# Patient Record
Sex: FEMALE | Race: WHITE
[De-identification: names, ages, dates, MRNs, and addresses within clinical notes are randomized per-mention and may not be internally consistent; named-entity substitution may affect disease eponyms.]

---

## 2017-01-25 ENCOUNTER — RECORDS - HEALTHEAST (OUTPATIENT)
Dept: ADMINISTRATIVE | Facility: OTHER | Age: 47
End: 2017-01-25

## 2017-12-14 DIAGNOSIS — N95.1 PERIMENOPAUSE: ICD-10-CM

## 2017-12-14 DIAGNOSIS — Z30.41 ENCOUNTER FOR SURVEILLANCE OF CONTRACEPTIVE PILLS: ICD-10-CM

## 2017-12-14 DIAGNOSIS — Z01.419 ENCOUNTER FOR ROUTINE GYNECOLOGICAL EXAMINATION: ICD-10-CM

## 2017-12-14 RX ORDER — LEVONORGESTREL/ETHIN.ESTRADIOL 0.1-0.02MG
1 TABLET ORAL DAILY
Qty: 90 TABLET | Refills: 3 | Status: SHIPPED | OUTPATIENT
Start: 2017-12-14

## 2017-12-14 NOTE — TELEPHONE ENCOUNTER
Received refill request for OCP. Patient up to date on PAP but hasn't been seen since 2015. Able to refill for one more year. Left message for patient to call and schedule annual appt.

## 2018-02-26 ENCOUNTER — RECORDS - HEALTHEAST (OUTPATIENT)
Dept: ADMINISTRATIVE | Facility: OTHER | Age: 48
End: 2018-02-26

## 2019-02-15 ENCOUNTER — HEALTH MAINTENANCE LETTER (OUTPATIENT)
Age: 49
End: 2019-02-15

## 2019-09-12 ENCOUNTER — RECORDS - HEALTHEAST (OUTPATIENT)
Dept: ADMINISTRATIVE | Facility: OTHER | Age: 49
End: 2019-09-12

## 2019-09-17 ENCOUNTER — RECORDS - HEALTHEAST (OUTPATIENT)
Dept: ADMINISTRATIVE | Facility: OTHER | Age: 49
End: 2019-09-17

## 2019-09-30 ENCOUNTER — HEALTH MAINTENANCE LETTER (OUTPATIENT)
Age: 49
End: 2019-09-30

## 2019-11-26 ENCOUNTER — OFFICE VISIT - HEALTHEAST (OUTPATIENT)
Dept: FAMILY MEDICINE | Facility: CLINIC | Age: 49
End: 2019-11-26

## 2019-11-26 DIAGNOSIS — Z20.828 EXPOSURE TO MONONUCLEOSIS SYNDROME: ICD-10-CM

## 2019-11-26 DIAGNOSIS — R53.83 FATIGUE, UNSPECIFIED TYPE: ICD-10-CM

## 2019-11-26 DIAGNOSIS — H66.002 NON-RECURRENT ACUTE SUPPURATIVE OTITIS MEDIA OF LEFT EAR WITHOUT SPONTANEOUS RUPTURE OF TYMPANIC MEMBRANE: ICD-10-CM

## 2019-11-26 LAB
ERYTHROCYTE [DISTWIDTH] IN BLOOD BY AUTOMATED COUNT: 9.7 % (ref 11–14.5)
HCG UR QL: NEGATIVE
HCT VFR BLD AUTO: 41.6 % (ref 35–47)
HGB BLD-MCNC: 14 G/DL (ref 12–16)
MCH RBC QN AUTO: 32.6 PG (ref 27–34)
MCHC RBC AUTO-ENTMCNC: 33.7 G/DL (ref 32–36)
MCV RBC AUTO: 97 FL (ref 80–100)
MONOCYTES NFR BLD AUTO: NEGATIVE %
PLATELET # BLD AUTO: 225 THOU/UL (ref 140–440)
PMV BLD AUTO: 8.8 FL (ref 7–10)
RBC # BLD AUTO: 4.31 MILL/UL (ref 3.8–5.4)
TSH SERPL DL<=0.005 MIU/L-ACNC: 4 UIU/ML (ref 0.3–5)
WBC: 4.6 THOU/UL (ref 4–11)

## 2019-12-05 ENCOUNTER — OFFICE VISIT - HEALTHEAST (OUTPATIENT)
Dept: FAMILY MEDICINE | Facility: CLINIC | Age: 49
End: 2019-12-05

## 2019-12-05 DIAGNOSIS — R53.83 FATIGUE, UNSPECIFIED TYPE: ICD-10-CM

## 2019-12-05 LAB
ALBUMIN SERPL-MCNC: 3.9 G/DL (ref 3.5–5)
ALP SERPL-CCNC: 21 U/L (ref 45–120)
ALT SERPL W P-5'-P-CCNC: 13 U/L (ref 0–45)
ANION GAP SERPL CALCULATED.3IONS-SCNC: 6 MMOL/L (ref 5–18)
AST SERPL W P-5'-P-CCNC: 16 U/L (ref 0–40)
ATRIAL RATE - MUSE: 50 BPM
BILIRUB SERPL-MCNC: 0.5 MG/DL (ref 0–1)
BUN SERPL-MCNC: 13 MG/DL (ref 8–22)
C REACTIVE PROTEIN LHE: <0.1 MG/DL (ref 0–0.8)
CALCIUM SERPL-MCNC: 9.1 MG/DL (ref 8.5–10.5)
CHLORIDE BLD-SCNC: 104 MMOL/L (ref 98–107)
CO2 SERPL-SCNC: 27 MMOL/L (ref 22–31)
CREAT SERPL-MCNC: 0.87 MG/DL (ref 0.6–1.1)
DIASTOLIC BLOOD PRESSURE - MUSE: NORMAL
ERYTHROCYTE [SEDIMENTATION RATE] IN BLOOD BY WESTERGREN METHOD: 2 MM/HR (ref 0–20)
GFR SERPL CREATININE-BSD FRML MDRD: >60 ML/MIN/1.73M2
GLUCOSE BLD-MCNC: 90 MG/DL (ref 70–125)
INTERPRETATION ECG - MUSE: NORMAL
MONOCYTES NFR BLD AUTO: NEGATIVE %
P AXIS - MUSE: 47 DEGREES
POTASSIUM BLD-SCNC: 4.4 MMOL/L (ref 3.5–5)
PR INTERVAL - MUSE: 158 MS
PROT SERPL-MCNC: 6.5 G/DL (ref 6–8)
QRS DURATION - MUSE: 80 MS
QT - MUSE: 424 MS
QTC - MUSE: 386 MS
R AXIS - MUSE: 59 DEGREES
SODIUM SERPL-SCNC: 137 MMOL/L (ref 136–145)
SYSTOLIC BLOOD PRESSURE - MUSE: NORMAL
T AXIS - MUSE: 55 DEGREES
VENTRICULAR RATE- MUSE: 50 BPM

## 2019-12-05 ASSESSMENT — MIFFLIN-ST. JEOR: SCORE: 1335.68

## 2019-12-06 LAB — B BURGDOR IGG+IGM SER QL: 0.05 INDEX VALUE

## 2019-12-09 ENCOUNTER — COMMUNICATION - HEALTHEAST (OUTPATIENT)
Dept: FAMILY MEDICINE | Facility: CLINIC | Age: 49
End: 2019-12-09

## 2019-12-09 LAB — ANA SER QL: 0 U

## 2019-12-10 ENCOUNTER — AMBULATORY - HEALTHEAST (OUTPATIENT)
Dept: LAB | Facility: CLINIC | Age: 49
End: 2019-12-10

## 2019-12-10 DIAGNOSIS — R53.83 FATIGUE, UNSPECIFIED TYPE: ICD-10-CM

## 2019-12-10 LAB — CORTIS SERPL-MCNC: 19.1 UG/DL

## 2019-12-11 ENCOUNTER — COMMUNICATION - HEALTHEAST (OUTPATIENT)
Dept: FAMILY MEDICINE | Facility: CLINIC | Age: 49
End: 2019-12-11

## 2020-03-22 ENCOUNTER — HEALTH MAINTENANCE LETTER (OUTPATIENT)
Age: 50
End: 2020-03-22

## 2021-01-15 ENCOUNTER — HEALTH MAINTENANCE LETTER (OUTPATIENT)
Age: 51
End: 2021-01-15

## 2021-05-09 ENCOUNTER — HEALTH MAINTENANCE LETTER (OUTPATIENT)
Age: 51
End: 2021-05-09

## 2021-06-03 VITALS
SYSTOLIC BLOOD PRESSURE: 131 MMHG | DIASTOLIC BLOOD PRESSURE: 81 MMHG | HEART RATE: 84 BPM | TEMPERATURE: 98.3 F | OXYGEN SATURATION: 96 % | BODY MASS INDEX: 21.71 KG/M2 | RESPIRATION RATE: 16 BRPM | WEIGHT: 147 LBS

## 2021-06-03 VITALS
HEART RATE: 68 BPM | TEMPERATURE: 97.9 F | BODY MASS INDEX: 22.33 KG/M2 | RESPIRATION RATE: 16 BRPM | DIASTOLIC BLOOD PRESSURE: 70 MMHG | SYSTOLIC BLOOD PRESSURE: 100 MMHG | HEIGHT: 68 IN | WEIGHT: 147.31 LBS

## 2021-06-04 NOTE — TELEPHONE ENCOUNTER
----- Message from Iris Lomeli DO sent at 12/9/2019 12:47 PM CST -----  Normal lab tests, patient updated by [facility].    Please call to schedule 8am cortisol test. Orders placed.   Thank you!  Iris Lomeli DO

## 2021-06-04 NOTE — TELEPHONE ENCOUNTER
Test Results  Who is calling?:  patient  Who ordered the test:  Iris Lomeli DO  Type of test: Lab  Date of test:  12/5/2019  Where was the test performed:  [facility]  What are your questions/concerns?:  Patient reports she sees her results on her my chart. Informed patient 1 test is still in process. Patient reports her symptoms have remained unchanged. Would like to proceed with the next step asap! Please advise!  Okay to leave a detailed message?:  Yes

## 2021-06-04 NOTE — PROGRESS NOTES
Normal lab tests, [facility]    Please call to schedule 8am cortisol test. Orders placed.   Thank you!  Iris Lomeli, DO

## 2021-06-04 NOTE — PROGRESS NOTES
"[facility] Clinic Note    Name: Debbie Ibarra  : 1970   MRN: [id number]    Debbie Ibarra is a 48 y.o. female presenting to discuss the following:     CC:   Chief Complaint   Patient presents with     Establish Care     Fatigue       HPI:  Active at baseline, exercises regularly, doesn't often get sick. 3 weeks ago, was off from work, was hard to get off the cough. Progressed from there. Had family in town, has spent the majority of the time on the cough. No fever, no symptoms. Appetite has been down. Hasn't worked out in the last few weeks. Reports generalized weakness. Tried to run once and \"sent over the edge\". Reports really low energy levels, has good and bad days alternating. Not great on good days, but feels like can be able to make it through the day.   Works in [employer], has to \"be on\", but is exhausted. Making self eat but not eating regular food, makes her feel nauseated, triggered by smells.     In the past 6 months, has been cold, worse when fatigued. Hard to warm up in front of the fireplace. Putting on sweats, hat, gloves, and sits in front of fireplace and still not able to get warm. Has never felt this way before. Only feels this way when tired.     Went to the clinic last week and had thyroid testing. Had ear infection, treated with amoxicillin.     Has been on OCPs for a long time, was having persistent spotting so continued.     Is up to date on pap smear. [facility]es [facility].     ROS:   CONSTITUTIONAL: See above. Weight is stable.   HEENT: No sore throat, no ear pain. No rhinorrhea, no facial pain/pressure. No vision changes.   HEART: No chest pain, no palpitations, no tachycardia, no lightheadedness, no dizziness, no presyncope or syncope.   LUNGS: No cough, no wheeze, no shortness of breath.   ABDOMEN: No changes in bowel movements, no abdominal pain.   : No dysuria, no changes in frequency, no hematuria.   MSK: NO myalgias or arthralgias.   NEURO: Has an aching in " "head different from typical headaches.   PSYCH: Denies depression.   DERM: No skin changes.     PMH:   There is no problem list on file for this patient.      History reviewed. No pertinent past medical history.    PSH:   Past Surgical History:   Procedure Laterality Date     breast implant Bilateral      MEDICATIONS:   Current Outpatient Medications on File Prior to Visit   Medication Sig Dispense Refill     amoxicillin (AMOXIL) 875 MG tablet Take 1 tablet (875 mg total) by mouth 2 (two) times a day for 10 days. Take with food. 20 tablet 0     levonorgestrel-ethinyl estradiol (AVIANE,ALESSE,LESSINA) 0.1-20 mg-mcg per tablet Take 1 tablet by mouth.       No current facility-administered medications on file prior to visit.      ALLERGIES:  Allergies   Allergen Reactions     Azithromycin Hives     FAMHx:  Family History   Problem Relation Age of Onset     Breast cancer Mother      Hypertension Father      Hyperlipidemia Father      Breast cancer Sister      Colon cancer Brother         originated in appendix, isolated      Breast cancer Maternal Grandmother      Breast cancer Paternal Grandmother        SOCIAL HISTORY:   Social History     Tobacco Use     Smoking status: Never Smoker     Smokeless tobacco: Never Used   Substance Use Topics     Alcohol use: Yes     Drug use: Never       PHYSICAL EXAM:   /70   Pulse 68   Temp 97.9  F (36.6  C) (Oral)   Resp 16   Ht 5' 8.25\" (1.734 m)   Wt 147 lb 5 oz (66.8 kg)   LMP  (LMP Unknown) Comment: birthcontrol  BMI 22.23 kg/m     GENERAL: Debbie is a well appearing, pleasant, non-toxic female. Appears physically fit and stated age.   HEENT: Sclera white, no nasal discharge, oropharynx pink and moist, no tonsillar hypertrophy, no cervical lymphadenopathy, no thyromegaly. Mild erythema mid left tympanic membrane.   HEART: Bradycardia with slight irregularity, no murmurs.  LUNGS: Clear to auscultation bilaterally, unlabored.   ABDOMEN: Soft, non-tender to palpation, no " palpable masses.   MSK: No deformities or effusions.  NEURO: No gross deficits. Speech intact, face symmetrical, normal gait.  PSYCH: Mood is good, normal affect, appropriately groomed, good eye contact.   DERM: No rashes present.     EKG: Rate 50 bpm, sinus bradycardia, normal axis, normal intervals, no ischemic changes. Awaiting cardiology review.     ASSESSMENT & PLAN:   Debbie Ibarra is a 48 y.o. female presenting today to establish care and for evaluation of persistent fatigue and cold intolerance.    1. Fatigue, unspecified type  - Mononucleosis Screen  - Comprehensive Metabolic Panel  - Lyme Antibody Cascade  - Antinuclear Antibody (BLANK) Cascade  - Sedimentation Rate  - C-Reactive Protein(CRP)  - Electrocardiogram Perform and Read     Given recent exposure to mononucleosis, recommended repeat screening for mono as sometimes may be falsely negative it testing within first week. Lower suspicion for mono however given lack of fever, no cervical lymphadenopathy, and no tonsillar hypertrophy.     CBC and TSH at [facility] within the last 2 weeks. Will not repeat this testing. Discussed differential of arrhythmia vs autoimmune disorder vs adrenal insufficiency vs metabolic derangement vs mood disorder vs chronic fatigue syndrome. She denies depression. She does request lyme screening given exposure risk.     EKG in clinic consistent with sinus bradycardia, not unexpected in athletic patient. No sign ov AV block. Given exercise intolerance, consider cardiac event monitor if remaining work up is negative.     Electrolytes obtained to screen for abnormalities consistent with adrenal insufficiency. Consider AM cortisol testing even if within normal limits.     HM: LEATHA signed for [facility] records     RTC: pending lab results    Iris Lomeli DO

## 2021-06-04 NOTE — TELEPHONE ENCOUNTER
Left message to call back for: lab results, needs 8am lab only appt for cortisol test.  Information to relay to patient:  Dr. Lomeli's message below. Please assist pt with scheduling lab only appt upon return call.

## 2021-06-17 NOTE — PATIENT INSTRUCTIONS - HE
Patient Instructions by Raciel Deluna CNP at 11/26/2019  5:10 PM     Author: Raciel Deluna CNP Service: -- Author Type: Nurse Practitioner    Filed: 11/26/2019  6:35 PM Encounter Date: 11/26/2019 Status: Signed    : Raciel Deluna CNP (Nurse Practitioner)         Patient Education     Middle Ear Infection (Adult)  You have an infection of the middle ear, the space behind the eardrum. This is also called acute otitis media (AOM). Sometimes it is caused by the common cold. This is because congestion can block the internal passage (eustachian tube) that drains fluid from the middle ear. When the middle ear fills with fluid, bacteria can grow there and cause an infection. Oral antibiotics are used to treat this illness, not ear drops. Symptoms usually start to improve within 1 to 2 days of treatment.    Home care  The following are general care guidelines:    Finish all of the antibiotic medicine given, even though you may feel better after the first few days.    You may use over-the-counter medicine, such as acetaminophen or ibuprofen, to control pain and fever, unless something else was prescribed. If you have chronic liver or kidney disease or have ever had a stomach ulcer or gastrointestinal bleeding, talk with your healthcare provider before using these medicines. Do not give aspirin to anyone under 18 years of age who has a fever. It may cause severe illness or death.  Follow-up care  Follow up with your healthcare provider, or as advised, in 2 weeks if all symptoms have not gotten better, or if hearing doesn't go back to normal within 1 month.  When to seek medical advice  Call your healthcare provider right away if any of these occur:    Ear pain gets worse or does not improve after 3 days of treatment    Unusual drowsiness or confusion    Neck pain, stiff neck, or headache    Fluid or blood draining from the ear canal    Fever of 100.4 F (38 C) or as advised     Seizure  Date Last Reviewed:  6/1/2016 2000-2017 [employer]. [address] [place] [address], [place] [address]. All rights reserved. This information is not intended as a substitute for professional medical care. Always follow your healthcare professional's instructions.           Patient Education     Thyroid Stimulating Hormone  Does this test have other names?  TSH,  thyrotropin test  What is this test?  This is a blood test that measures your level of thyroid stimulating hormone (TSH). Healthcare providers use this test to diagnose problems affecting the thyroid.  Your thyroid is a butterfly-shaped gland near the base of your throat above your collarbones. The thyroid makes two hormones, T3 and T4, that affect your energy levels, mood, weight, and other important parts of your health.  The pituitary gland in your brain makes a chemical called TSH, which triggers your thyroid to make T3 and T4. When your pituitary gland makes too much or too little TSH, this can cause your thyroid to be overactive (hyperthyroidism) or underactive (hypothyroidism).   Why do I need this test?  You may need this test if you have symptoms of thyroid problems.  Symptoms of hyperthyroidism include:    Anxiety and mood swings    Irritability    Weakness in the arms and legs    Insomnia    Hand tremors    Sweating    Low tolerance for heat    Irregular heartbeat    Fatigue    Unexplained weight loss    More frequent bowel movements than usual    Eye irritation or bulging eyes, which are symptoms of Graves disease, a common cause of hyperthyroidism    Menstrual irregularity    Enlarged breasts and erectile dysfunction in men  Symptoms of hypothyroidism include:    Fatigue    Low tolerance for cold    Weight gain    Hair loss    Eye swelling    Slower heart rate    Shortness of breath    Constipation    Menstrual irregularity    Loss of consciousness, although this is rare  Healthcare providers may also check TSH levels when diagnosing depression and  dementia.   What other tests might I have along with this test?  In addition to T4, you may have other tests of thyroid-related substances, including:    T4    Free T4    T3    Free T3    Thyroglobulin, which helps produce and store thyroid hormones    TSH receptor-stimulator antibodies, which is used to diagnose Graves disease    Thyroid antiperoxidase antibodies and thyroglobulin antibodies. These are used to diagnose a condition called Hashimoto's thyroiditis.  What do my test results mean?  Many things may affect your lab test results. These include the method each lab uses to do the test. Even if your test results are different from the normal value, you may not have a problem. To learn what the results mean for you, talk with your healthcare provider.  Low TSH may mean you have hyperthyroidism, and high TSH can mean hypothyroidism. The results of other thyroid tests can help to find the cause.   How is this test done?  The test requires a blood sample, which is drawn through a needle from a vein in your arm.  Does this test pose any risks?  Taking a blood sample with a needle carries risks that include bleeding, infection, bruising, or feeling dizzy. When the needle pricks your arm, you may feel a slight stinging sensation or pain. Afterward, the site may be slightly sore.   What might affect my test results?  Some medicines keep the pituitary gland from releasing TSH. These include:    Phenothiazines    Phenytoin    Dopamine    Glucocorticoids  Other medicines that can affect thyroid tests include:    Beta blockers    Dexamethasone    Enoxaparin    Furosemide    Heparin    NSAIDs    Salicylates  How do I get ready for this test?  Tell your healthcare provider if you're taking medicine. Certain medicines can affect thyroid test results.  Be sure your provider knows about all medicines, herbs, vitamins, and supplements you are taking. This includes medicines that don't need a prescription and any illicit drugs  you may use.     7207-2623 [employer]. [address] [place] [address], [place] [address]. All rights reserved. This information is not intended as a substitute for professional medical care. Always follow your healthcare professional's instructions.           Patient Education     Complete Blood Count  Does this test have other names?      CBC  What is this test?  The complete blood count (CBC) is a blood test used to screen your overall health and to look for many different illnesses, including anemia, infections, and leukemia. The test extracts a large amount of information from the blood sample you've given, including:    The number and types of white blood cells (WBCs). There are 5 types of WBCs. All play a role in fighting infection. High numbers of WBCs, or of a specific type of WBC,  may mean you have an infection or inflammation somewhere in your body. Low numbers of WBCs may mean you are at risk for infections.    The number of red blood cells (RBCs). RBCs carry oxygen throughout the body and remove excess carbon dioxide. Too few RBCs may be a sign of anemia or other diseases. In rare cases, too many RBCs may cause problems with blood flow.    How the size of your red blood cells varies. This test is known as red cell distribution width (RDW). For instance, you'll probably have greater differences in red blood cell size if you have anemia.    Hematocrit. This means the portion of red blood cells in a certain amount of whole blood. A low hematocrit may be a sign of too much bleeding. Or it might mean that you have iron deficiency or other disorders. A higher than normal hematocrit can be caused by dehydration or other disorders.    Hemoglobin. Hemoglobin is a protein in red blood cells. It carries oxygen from the lungs to the rest of the body. Abnormalities can be a sign of problems ranging from anemia to lung disease.    The average size of your red blood cells. This test is known as mean corpuscular  volume (MCV). MCV goes up when your red blood cells are bigger than normal. This happens if you have anemia caused by low vitamin B12 or folate levels. If your red blood cells are smaller, this can mean other types of anemia, such as iron deficiency anemia.    A platelet count. Platelets are cell fragments that play a role in blood clotting. Too few platelets may mean you have a higher risk of bleeding. Too many may mean a number of possible conditions.   Why do I need this test?  You may need this test if you have:    Unusual bleeding or bruising    Infection or inflammation    Weakness and tiredness that doesnt go away. These may be symptoms of anemia.  You may also have this test if your healthcare provider thinks you may have a certain disease or condition. Or you may have this test as part of a routine exam to check your health. The test may also be used to see how well certain treatments are working.  What other tests might I have along with this test?  Your healthcare provider may order other tests if your CBC results are abnormal. These may include other blood tests, urine tests, and bone marrow or spinal fluid tests.    What do my test results mean?  Test results may vary depending on your age, gender, health history, the method used for the test, and other things. Your test results may not mean you have a problem. Ask your healthcare provider what your test results mean for you.   Although estimates vary from lab to lab, here are some typical normal ranges for the main parts of the CBC:    Red blood cell count: 3.93 to 5.69 million cells per cubic millimeter    Hemoglobin: 12.6 to 17.5 grams per deciliter (g/dL) for males; 12.0 to 16 g/dL for females    Hematocrit: 38 to 47.7  percent    White blood cell count: 3,300 to  8,700 cells per cubic millimeter    Platelet count: 147,000 to 347,000 per cubic millimeter  Abnormal test results can have many causes. Some of these causes might not be something that  needs to be treated. The most common problem found through the CBC is mild anemia. You may have more testing, depending on how severe the anemia is and whether other problems crop up in the test.     How is this test done?  The test is done with a blood sample. A needle is used to draw blood from a vein in your arm or hand.   Does this test pose any risks?  Taking a blood sample with a needle carries risks that include bleeding, infection, bruising, or feeling dizzy. When the needle pricks your arm, you may feel a slight stinging sensation or pain. Afterward, the site may be slightly sore.  What might affect my test results?  Certain medicines might affect your results. Talk with your healthcare provider about the medicines you are taking.  How do I get ready for this test?  You don't need to prepare for this test. Be sure your healthcare provider knows about all medicines, herbs, vitamins, and supplements you are taking. This includes medicines that don't need a prescription and any illicit drugs you may use.     5903-5082 [employer]. [address] [place] [address], [place] [address]. All rights reserved. This information is not intended as a substitute for professional medical care. Always follow your healthcare professional's instructions.

## 2021-06-28 NOTE — PROGRESS NOTES
Progress Notes by Raciel Deluna CNP at 11/26/2019  5:10 PM     Author: Raciel Deluna CNP Service: -- Author Type: Nurse Practitioner    Filed: 11/26/2019  6:40 PM Encounter Date: 11/26/2019 Status: Signed    : Raciel Deluna CNP (Nurse Practitioner)       Chief Complaint   Patient presents with   ? Headache     no fever, no appetite, no energy,  Started last tuesday - mother had mono visited 1 month ago       HPI:  Debbie Ibarra is a 48 y.o. female who presents today complaining of 1 week of progressive fatigue and no other cold symptoms. Presents concerned about + mono exposure from mother recently.     LMP: Amenorrhea on continuous OCP    History obtained from the patient.    Problem List:  There are no relevant problems documented for this patient.      No past medical history on file.    Social History     Tobacco Use   ? Smoking status: Never Smoker   ? Smokeless tobacco: Never Used   Substance Use Topics   ? Alcohol use: Not on file       Review of Systems   Constitutional: Positive for activity change, appetite change and fatigue. Negative for chills and fever.   HENT: Negative for congestion, ear pain, sinus pressure, sinus pain and sore throat.    Respiratory: Negative for cough, shortness of breath and wheezing.    Gastrointestinal: Negative for diarrhea and vomiting.   Genitourinary: Negative for dysuria.   Musculoskeletal: Negative for myalgias.   Neurological: Negative for headaches.       Vitals:    11/26/19 1715   BP: 131/81   Pulse: 84   Resp: 16   Temp: 98.3  F (36.8  C)   TempSrc: Oral   SpO2: 96%   Weight: 147 lb (66.7 kg)       Physical Exam  Constitutional:       General: She is not in acute distress.     Appearance: She is ill-appearing. She is not toxic-appearing or diaphoretic.   HENT:      Right Ear: Tympanic membrane, ear canal and external ear normal. There is no impacted cerumen.      Left Ear: Ear canal and external ear normal. There is no impacted cerumen.      Ears:       Comments: LEFT ear with cloudy fluid, mild erythema no landmarks noted       Nose: Nose normal. No congestion or rhinorrhea.      Mouth/Throat:      Mouth: Mucous membranes are moist.      Pharynx: No oropharyngeal exudate or posterior oropharyngeal erythema.   Neck:      Musculoskeletal: Neck supple. No muscular tenderness.   Cardiovascular:      Rate and Rhythm: Normal rate and regular rhythm.      Pulses: Normal pulses.      Heart sounds: Normal heart sounds. No murmur. No friction rub. No gallop.    Pulmonary:      Effort: Pulmonary effort is normal. No respiratory distress.      Breath sounds: Normal breath sounds. No stridor. No wheezing, rhonchi or rales.   Chest:      Chest wall: No tenderness.   Abdominal:      General: There is no distension.      Palpations: Abdomen is soft. There is no mass.      Tenderness: There is no abdominal tenderness. There is no right CVA tenderness, left CVA tenderness, guarding or rebound.      Hernia: No hernia is present.   Lymphadenopathy:      Cervical: Cervical adenopathy present.   Skin:     General: Skin is warm.      Capillary Refill: Capillary refill takes less than 2 seconds.      Findings: No rash.   Neurological:      General: No focal deficit present.      Mental Status: She is alert and oriented to person, place, and time. Mental status is at baseline.   Psychiatric:         Mood and Affect: Mood normal.         Behavior: Behavior normal.         No notes on file    Labs:  Recent Results (from the past 72 hour(s))   Mononucleosis Screen   Result Value Ref Range    Mono Screen Negative Negative   Pregnancy (Beta-hCG, Qual), Urine   Result Value Ref Range    Pregnancy Test, Urine Negative Negative       Radiology:No results found.    Clinical Decision Making: At the end of the encounter, I discussed results, diagnosis, medications. Discussed red flags for immediate return to clinic/ER, as well as indications for follow up if no improvement. Patient understood  and agreed to plan.     ARCENIO Buitrago, CNP       1. Non-recurrent acute suppurative otitis media of left ear without spontaneous rupture of tympanic membrane  amoxicillin (AMOXIL) 875 MG tablet   2. Exposure to mononucleosis syndrome  Mononucleosis Screen    Pregnancy (Beta-hCG, Qual), Urine    JIC RED   3. Fatigue, unspecified type  Thyroid Stimulating Hormone (TSH)         Patient Instructions     Patient Education     Middle Ear Infection (Adult)  You have an infection of the middle ear, the space behind the eardrum. This is also called acute otitis media (AOM). Sometimes it is caused by the common cold. This is because congestion can block the internal passage (eustachian tube) that drains fluid from the middle ear. When the middle ear fills with fluid, bacteria can grow there and cause an infection. Oral antibiotics are used to treat this illness, not ear drops. Symptoms usually start to improve within 1 to 2 days of treatment.    Home care  The following are general care guidelines:    Finish all of the antibiotic medicine given, even though you may feel better after the first few days.    You may use over-the-counter medicine, such as acetaminophen or ibuprofen, to control pain and fever, unless something else was prescribed. If you have chronic liver or kidney disease or have ever had a stomach ulcer or gastrointestinal bleeding, talk with your healthcare provider before using these medicines. Do not give aspirin to anyone under 18 years of age who has a fever. It may cause severe illness or death.  Follow-up care  Follow up with your healthcare provider, or as advised, in 2 weeks if all symptoms have not gotten better, or if hearing doesn't go back to normal within 1 month.  When to seek medical advice  Call your healthcare provider right away if any of these occur:    Ear pain gets worse or does not improve after 3 days of treatment    Unusual drowsiness or confusion    Neck pain, stiff neck, or  headache    Fluid or blood draining from the ear canal    Fever of 100.4 F (38 C) or as advised     Seizure  Date Last Reviewed: 6/1/2016 2000-2017 [employer]. [address] [place] [address], [place] [address]. All rights reserved. This information is not intended as a substitute for professional medical care. Always follow your healthcare professional's instructions.           Patient Education     Thyroid Stimulating Hormone  Does this test have other names?  TSH,  thyrotropin test  What is this test?  This is a blood test that measures your level of thyroid stimulating hormone (TSH). Healthcare providers use this test to diagnose problems affecting the thyroid.  Your thyroid is a butterfly-shaped gland near the base of your throat above your collarbones. The thyroid makes two hormones, T3 and T4, that affect your energy levels, mood, weight, and other important parts of your health.  The pituitary gland in your brain makes a chemical called TSH, which triggers your thyroid to make T3 and T4. When your pituitary gland makes too much or too little TSH, this can cause your thyroid to be overactive (hyperthyroidism) or underactive (hypothyroidism).   Why do I need this test?  You may need this test if you have symptoms of thyroid problems.  Symptoms of hyperthyroidism include:    Anxiety and mood swings    Irritability    Weakness in the arms and legs    Insomnia    Hand tremors    Sweating    Low tolerance for heat    Irregular heartbeat    Fatigue    Unexplained weight loss    More frequent bowel movements than usual    Eye irritation or bulging eyes, which are symptoms of Graves disease, a common cause of hyperthyroidism    Menstrual irregularity    Enlarged breasts and erectile dysfunction in men  Symptoms of hypothyroidism include:    Fatigue    Low tolerance for cold    Weight gain    Hair loss    Eye swelling    Slower heart rate    Shortness of breath    Constipation    Menstrual  irregularity    Loss of consciousness, although this is rare  Healthcare providers may also check TSH levels when diagnosing depression and dementia.   What other tests might I have along with this test?  In addition to T4, you may have other tests of thyroid-related substances, including:    T4    Free T4    T3    Free T3    Thyroglobulin, which helps produce and store thyroid hormones    TSH receptor-stimulator antibodies, which is used to diagnose Graves disease    Thyroid antiperoxidase antibodies and thyroglobulin antibodies. These are used to diagnose a condition called Hashimoto's thyroiditis.  What do my test results mean?  Many things may affect your lab test results. These include the method each lab uses to do the test. Even if your test results are different from the normal value, you may not have a problem. To learn what the results mean for you, talk with your healthcare provider.  Low TSH may mean you have hyperthyroidism, and high TSH can mean hypothyroidism. The results of other thyroid tests can help to find the cause.   How is this test done?  The test requires a blood sample, which is drawn through a needle from a vein in your arm.  Does this test pose any risks?  Taking a blood sample with a needle carries risks that include bleeding, infection, bruising, or feeling dizzy. When the needle pricks your arm, you may feel a slight stinging sensation or pain. Afterward, the site may be slightly sore.   What might affect my test results?  Some medicines keep the pituitary gland from releasing TSH. These include:    Phenothiazines    Phenytoin    Dopamine    Glucocorticoids  Other medicines that can affect thyroid tests include:    Beta blockers    Dexamethasone    Enoxaparin    Furosemide    Heparin    NSAIDs    Salicylates  How do I get ready for this test?  Tell your healthcare provider if you're taking medicine. Certain medicines can affect thyroid test results.  Be sure your provider knows about  all medicines, herbs, vitamins, and supplements you are taking. This includes medicines that don't need a prescription and any illicit drugs you may use.     8165-3798 [employer]. [address] [place] [address], [place] [address]. All rights reserved. This information is not intended as a substitute for professional medical care. Always follow your healthcare professional's instructions.           Patient Education     Complete Blood Count  Does this test have other names?      CBC  What is this test?  The complete blood count (CBC) is a blood test used to screen your overall health and to look for many different illnesses, including anemia, infections, and leukemia. The test extracts a large amount of information from the blood sample you've given, including:    The number and types of white blood cells (WBCs). There are 5 types of WBCs. All play a role in fighting infection. High numbers of WBCs, or of a specific type of WBC,  may mean you have an infection or inflammation somewhere in your body. Low numbers of WBCs may mean you are at risk for infections.    The number of red blood cells (RBCs). RBCs carry oxygen throughout the body and remove excess carbon dioxide. Too few RBCs may be a sign of anemia or other diseases. In rare cases, too many RBCs may cause problems with blood flow.    How the size of your red blood cells varies. This test is known as red cell distribution width (RDW). For instance, you'll probably have greater differences in red blood cell size if you have anemia.    Hematocrit. This means the portion of red blood cells in a certain amount of whole blood. A low hematocrit may be a sign of too much bleeding. Or it might mean that you have iron deficiency or other disorders. A higher than normal hematocrit can be caused by dehydration or other disorders.    Hemoglobin. Hemoglobin is a protein in red blood cells. It carries oxygen from the lungs to the rest of the body. Abnormalities can  be a sign of problems ranging from anemia to lung disease.    The average size of your red blood cells. This test is known as mean corpuscular volume (MCV). MCV goes up when your red blood cells are bigger than normal. This happens if you have anemia caused by low vitamin B12 or folate levels. If your red blood cells are smaller, this can mean other types of anemia, such as iron deficiency anemia.    A platelet count. Platelets are cell fragments that play a role in blood clotting. Too few platelets may mean you have a higher risk of bleeding. Too many may mean a number of possible conditions.   Why do I need this test?  You may need this test if you have:    Unusual bleeding or bruising    Infection or inflammation    Weakness and tiredness that doesnt go away. These may be symptoms of anemia.  You may also have this test if your healthcare provider thinks you may have a certain disease or condition. Or you may have this test as part of a routine exam to check your health. The test may also be used to see how well certain treatments are working.  What other tests might I have along with this test?  Your healthcare provider may order other tests if your CBC results are abnormal. These may include other blood tests, urine tests, and bone marrow or spinal fluid tests.    What do my test results mean?  Test results may vary depending on your age, gender, health history, the method used for the test, and other things. Your test results may not mean you have a problem. Ask your healthcare provider what your test results mean for you.   Although estimates vary from lab to lab, here are some typical normal ranges for the main parts of the CBC:    Red blood cell count: 3.93 to 5.69 million cells per cubic millimeter    Hemoglobin: 12.6 to 17.5 grams per deciliter (g/dL) for males; 12.0 to 16 g/dL for females    Hematocrit: 38 to 47.7  percent    White blood cell count: 3,300 to  8,700 cells per cubic millimeter    Platelet  count: 147,000 to 347,000 per cubic millimeter  Abnormal test results can have many causes. Some of these causes might not be something that needs to be treated. The most common problem found through the CBC is mild anemia. You may have more testing, depending on how severe the anemia is and whether other problems crop up in the test.     How is this test done?  The test is done with a blood sample. A needle is used to draw blood from a vein in your arm or hand.   Does this test pose any risks?  Taking a blood sample with a needle carries risks that include bleeding, infection, bruising, or feeling dizzy. When the needle pricks your arm, you may feel a slight stinging sensation or pain. Afterward, the site may be slightly sore.  What might affect my test results?  Certain medicines might affect your results. Talk with your healthcare provider about the medicines you are taking.  How do I get ready for this test?  You don't need to prepare for this test. Be sure your healthcare provider knows about all medicines, herbs, vitamins, and supplements you are taking. This includes medicines that don't need a prescription and any illicit drugs you may use.     3540-0188 [employer]. [address] [place] [address], [place] [address]. All rights reserved. This information is not intended as a substitute for professional medical care. Always follow your healthcare professional's instructions.

## 2021-07-03 NOTE — ADDENDUM NOTE
Addendum Note by David Lomeli DO at 12/5/2019  3:00 PM     Author: David Lomeli DO Service: -- Author Type: Physician    Filed: 12/9/2019 12:48 PM Encounter Date: 12/5/2019 Status: Signed    : David Lomeli DO (Physician)    Addended by: DAVID LOMELI on: 12/9/2019 12:48 PM        Modules accepted: Orders

## 2021-10-24 ENCOUNTER — HEALTH MAINTENANCE LETTER (OUTPATIENT)
Age: 51
End: 2021-10-24

## 2022-02-13 ENCOUNTER — HEALTH MAINTENANCE LETTER (OUTPATIENT)
Age: 52
End: 2022-02-13

## 2022-06-05 ENCOUNTER — HEALTH MAINTENANCE LETTER (OUTPATIENT)
Age: 52
End: 2022-06-05

## 2022-10-16 ENCOUNTER — HEALTH MAINTENANCE LETTER (OUTPATIENT)
Age: 52
End: 2022-10-16

## 2023-03-26 ENCOUNTER — HEALTH MAINTENANCE LETTER (OUTPATIENT)
Age: 53
End: 2023-03-26

## 2023-06-17 ENCOUNTER — HEALTH MAINTENANCE LETTER (OUTPATIENT)
Age: 53
End: 2023-06-17

## 2024-12-16 ENCOUNTER — MEDICAL CORRESPONDENCE (OUTPATIENT)
Dept: HEALTH INFORMATION MANAGEMENT | Facility: CLINIC | Age: 54
End: 2024-12-16
Payer: COMMERCIAL

## 2024-12-23 ENCOUNTER — PATIENT OUTREACH (OUTPATIENT)
Dept: ONCOLOGY | Facility: CLINIC | Age: 54
End: 2024-12-23
Payer: COMMERCIAL

## 2024-12-23 ENCOUNTER — TRANSCRIBE ORDERS (OUTPATIENT)
Dept: OTHER | Age: 54
End: 2024-12-23

## 2024-12-23 DIAGNOSIS — Z80.3 FAMILY HISTORY OF BREAST CANCER: Primary | ICD-10-CM

## 2024-12-23 NOTE — PROGRESS NOTES
New Patient Oncology Nurse Navigator Note     Referring provider:     Leah Dc APRN CNM        Referring Clinic/Organization: [facility]      Referred to (specialty:) Genetic Counseling and Cancer Risk Management     Requested provider (if applicable): NA     Date Referral Received: December 23, 2024     Evaluation for:  Z80.3 (ICD-10-CM) - Family history of breast cancer     Debbie has a strong family history of breast cancer and is interested in genetic testing for this. She would like a referral to see a genetic counselor.      Payor: MEDICA / Plan: MEDICA CHOICE / Product Type: Indemnity /     December 23, 2024  Referral received and reviewed.   Sent to NPS to schedule.     Jennifer VEGASN, RN, OCN  Oncology Nurse Navigator   [facility]  Cancer Care Service Line   New Patient Hem/Onc Scheduling / Referrals: [phone] (fax: [phone] )

## 2025-01-25 ENCOUNTER — HEALTH MAINTENANCE LETTER (OUTPATIENT)
Age: 55
End: 2025-01-25

## 2025-07-05 ENCOUNTER — HEALTH MAINTENANCE LETTER (OUTPATIENT)
Age: 55
End: 2025-07-05